# Patient Record
Sex: MALE | Race: OTHER | HISPANIC OR LATINO | URBAN - METROPOLITAN AREA
[De-identification: names, ages, dates, MRNs, and addresses within clinical notes are randomized per-mention and may not be internally consistent; named-entity substitution may affect disease eponyms.]

---

## 2020-03-02 ENCOUNTER — EMERGENCY (EMERGENCY)
Facility: HOSPITAL | Age: 50
LOS: 1 days | Discharge: HOME | End: 2020-03-02
Attending: EMERGENCY MEDICINE
Payer: SELF-PAY

## 2020-03-02 VITALS
WEIGHT: 175.05 LBS | HEART RATE: 67 BPM | HEIGHT: 71 IN | OXYGEN SATURATION: 97 % | RESPIRATION RATE: 20 BRPM | SYSTOLIC BLOOD PRESSURE: 140 MMHG | TEMPERATURE: 98 F | DIASTOLIC BLOOD PRESSURE: 83 MMHG

## 2020-03-02 PROCEDURE — 96374 THER/PROPH/DIAG INJ IV PUSH: CPT

## 2020-03-02 PROCEDURE — 99284 EMERGENCY DEPT VISIT MOD MDM: CPT

## 2020-03-02 PROCEDURE — 99284 EMERGENCY DEPT VISIT MOD MDM: CPT | Mod: 25

## 2020-03-02 RX ORDER — DIAZEPAM 5 MG
5 TABLET ORAL ONCE
Refills: 0 | Status: DISCONTINUED | OUTPATIENT
Start: 2020-03-02 | End: 2020-03-02

## 2020-03-02 RX ORDER — DIAZEPAM 5 MG
1 TABLET ORAL
Qty: 8 | Refills: 0
Start: 2020-03-02 | End: 2020-03-04

## 2020-03-02 RX ORDER — DEXAMETHASONE 0.5 MG/5ML
10 ELIXIR ORAL ONCE
Refills: 0 | Status: COMPLETED | OUTPATIENT
Start: 2020-03-02 | End: 2020-03-02

## 2020-03-02 RX ADMIN — Medication 500 MILLIGRAM(S): at 14:03

## 2020-03-02 RX ADMIN — Medication 5 MILLIGRAM(S): at 14:02

## 2020-03-02 RX ADMIN — Medication 10 MILLIGRAM(S): at 14:04

## 2020-03-02 NOTE — ED PROVIDER NOTE - ATTENDING CONTRIBUTION TO CARE
right shoulder pain radiates to right arm and forearm  right side paraspinal tenderness    good pulses    cervical radiculopathy

## 2020-03-02 NOTE — ED PROVIDER NOTE - PROGRESS NOTE DETAILS
History and findings suggestive of cervical radiculopathy. Will attempt to organize MRI c-spine through coordinator. Will need ortho spine follow up within 1 week. Pt is well appearing walking with steady gait, stable for discharge and follow up without fail with medical doctor. I had a detailed discussion with the patient and/or guardian regarding the historical points, exam findings, and any diagnostic results supporting the discharge diagnosis. Pt educated on care and need for follow up. Strict return instructions and red flag signs and symptoms discussed with patient. Questions answered. Pt shows understanding of discharge information and agrees to follow.

## 2020-03-02 NOTE — ED PROVIDER NOTE - OBJECTIVE STATEMENT
48 y/o M presents to ED complaining of pain to right shoulder blade radiating to right arm with tip of finger numbness x1w. Pt states he feels pinching to his right arm. Pt adds these symptoms occurred after waking up and there were no precedent factors. Pt adds he has neck stiffness. Pt states he took Tylenol to no relief. Pt denies neck injuries in the past. Pt allergic to Penicillin. 50 y/o M presents to ED complaining of pain to right shoulder blade radiating to back of the upper R arm then radiating to dorsal aspect of forearm with tip of R index finger numbness x1w. Pt states he feels pinching to his right arm. Symptoms are continuous, with intermittent worsening with certain movements. Pt adds these symptoms occurred after waking up from sleep and there were no precedent factors. Pt adds he has neck stiffness. Pt states he took Tylenol to no relief. Pt denies neck injuries in the past. Denies any fever, chills, focal weakness, chest pain, SOB, or any other complaints. Pt allergic to Penicillin.

## 2020-03-02 NOTE — ED PROVIDER NOTE - NSFOLLOWUPCLINICS_GEN_ALL_ED_FT
[facility]  Orthopedics  [address] [place] [address]  Phone: [phone]  Fax: [phone]  Follow Up Time:

## 2020-03-02 NOTE — ED PROVIDER NOTE - PATIENT PORTAL LINK FT
You can access the FollowMyHealth Patient Portal offered by [facility] by registering at the following website: [email or web address]. By joining [employer]’s FollowMyHealth portal, you will also be able to view your health information using other applications (apps) compatible with our system.

## 2020-03-02 NOTE — ED ADULT NURSE NOTE - NSIMPLEMENTINTERV_GEN_ALL_ED
Implemented All Universal Safety Interventions:  [place] to call system. Call bell, personal items and telephone within reach. Instruct patient to call for assistance. Room bathroom lighting operational. Non-slip footwear when patient is off stretcher. Physically safe environment: no spills, clutter or unnecessary equipment. Stretcher in lowest position, wheels locked, appropriate side rails in place.

## 2020-03-02 NOTE — ED PROVIDER NOTE - PHYSICAL EXAMINATION
No midline spinal tenderness. Positive spurling test. No midline spinal tenderness. Positive spurling test. C5-T1 nerves motor and sensory testing intact.

## 2020-03-02 NOTE — ED PROVIDER NOTE - NSFOLLOWUPINSTRUCTIONS_ED_ALL_ED_FT
Follow up with the spine orthopedist within 1 week    If you experience any new or worsening symptoms or if you are concerned you can always come back to the emergency for a re-evaluation.  If there were any prescriptions given to you during the visit today take them as prescribed. If you have any questions you can ask the pharmacist.

## 2020-03-02 NOTE — ED PROVIDER NOTE - CLINICAL SUMMARY MEDICAL DECISION MAKING FREE TEXT BOX
50 y/o M presents to ED complaining of right arm sensory symptoms and pain. History and findings suggestive of likely cervical radiculopathy around C5 and C6. Will need orthopedist follow up within 1w and MRI of cervical spine. Will give rx for medications and discharge home.

## 2021-01-27 ENCOUNTER — EMERGENCY (EMERGENCY)
Facility: HOSPITAL | Age: 51
LOS: 1 days | Discharge: HOME | End: 2021-01-27
Attending: EMERGENCY MEDICINE | Admitting: EMERGENCY MEDICINE
Payer: COMMERCIAL

## 2021-01-27 VITALS
DIASTOLIC BLOOD PRESSURE: 66 MMHG | HEIGHT: 71 IN | RESPIRATION RATE: 16 BRPM | OXYGEN SATURATION: 99 % | HEART RATE: 76 BPM | WEIGHT: 164.91 LBS | TEMPERATURE: 98 F | SYSTOLIC BLOOD PRESSURE: 103 MMHG

## 2021-01-27 VITALS
RESPIRATION RATE: 18 BRPM | DIASTOLIC BLOOD PRESSURE: 70 MMHG | SYSTOLIC BLOOD PRESSURE: 104 MMHG | OXYGEN SATURATION: 97 % | TEMPERATURE: 98 F | HEART RATE: 61 BPM

## 2021-01-27 DIAGNOSIS — N45.3 EPIDIDYMO-ORCHITIS: ICD-10-CM

## 2021-01-27 DIAGNOSIS — N50.812 LEFT TESTICULAR PAIN: ICD-10-CM

## 2021-01-27 LAB
ALBUMIN SERPL ELPH-MCNC: 3.8 G/DL — SIGNIFICANT CHANGE UP (ref 3.3–5)
ALP SERPL-CCNC: 65 U/L — SIGNIFICANT CHANGE UP (ref 40–120)
ALT FLD-CCNC: 19 U/L — SIGNIFICANT CHANGE UP (ref 10–45)
ANION GAP SERPL CALC-SCNC: 9 MMOL/L — SIGNIFICANT CHANGE UP (ref 5–17)
APPEARANCE UR: CLEAR — SIGNIFICANT CHANGE UP
AST SERPL-CCNC: 15 U/L — SIGNIFICANT CHANGE UP (ref 10–40)
BASOPHILS # BLD AUTO: 0.07 K/UL — SIGNIFICANT CHANGE UP (ref 0–0.2)
BASOPHILS NFR BLD AUTO: 0.4 % — SIGNIFICANT CHANGE UP (ref 0–2)
BILIRUB SERPL-MCNC: 0.2 MG/DL — SIGNIFICANT CHANGE UP (ref 0.2–1.2)
BILIRUB UR-MCNC: NEGATIVE — SIGNIFICANT CHANGE UP
BUN SERPL-MCNC: 12 MG/DL — SIGNIFICANT CHANGE UP (ref 7–23)
CALCIUM SERPL-MCNC: 9 MG/DL — SIGNIFICANT CHANGE UP (ref 8.4–10.5)
CHLORIDE SERPL-SCNC: 103 MMOL/L — SIGNIFICANT CHANGE UP (ref 96–108)
CO2 SERPL-SCNC: 29 MMOL/L — SIGNIFICANT CHANGE UP (ref 22–31)
COLOR SPEC: YELLOW — SIGNIFICANT CHANGE UP
CREAT SERPL-MCNC: 0.83 MG/DL — SIGNIFICANT CHANGE UP (ref 0.5–1.3)
DIFF PNL FLD: ABNORMAL
EOSINOPHIL # BLD AUTO: 0.52 K/UL — HIGH (ref 0–0.5)
EOSINOPHIL NFR BLD AUTO: 3 % — SIGNIFICANT CHANGE UP (ref 0–6)
EPI CELLS # UR: SIGNIFICANT CHANGE UP /HPF (ref 0–5)
GLUCOSE SERPL-MCNC: 86 MG/DL — SIGNIFICANT CHANGE UP (ref 70–99)
GLUCOSE UR QL: NEGATIVE — SIGNIFICANT CHANGE UP
HCT VFR BLD CALC: 40.4 % — SIGNIFICANT CHANGE UP (ref 39–50)
HGB BLD-MCNC: 12.5 G/DL — LOW (ref 13–17)
IMM GRANULOCYTES NFR BLD AUTO: 0.7 % — SIGNIFICANT CHANGE UP (ref 0–1.5)
KETONES UR-MCNC: NEGATIVE — SIGNIFICANT CHANGE UP
LEUKOCYTE ESTERASE UR-ACNC: NEGATIVE — SIGNIFICANT CHANGE UP
LYMPHOCYTES # BLD AUTO: 19.5 % — SIGNIFICANT CHANGE UP (ref 13–44)
LYMPHOCYTES # BLD AUTO: 3.34 K/UL — HIGH (ref 1–3.3)
MCHC RBC-ENTMCNC: 28.7 PG — SIGNIFICANT CHANGE UP (ref 27–34)
MCHC RBC-ENTMCNC: 30.9 GM/DL — LOW (ref 32–36)
MCV RBC AUTO: 92.9 FL — SIGNIFICANT CHANGE UP (ref 80–100)
MONOCYTES # BLD AUTO: 1.21 K/UL — HIGH (ref 0–0.9)
MONOCYTES NFR BLD AUTO: 7.1 % — SIGNIFICANT CHANGE UP (ref 2–14)
NEUTROPHILS # BLD AUTO: 11.89 K/UL — HIGH (ref 1.8–7.4)
NEUTROPHILS NFR BLD AUTO: 69.3 % — SIGNIFICANT CHANGE UP (ref 43–77)
NITRITE UR-MCNC: NEGATIVE — SIGNIFICANT CHANGE UP
NRBC # BLD: 0 /100 WBCS — SIGNIFICANT CHANGE UP (ref 0–0)
PH UR: 6 — SIGNIFICANT CHANGE UP (ref 5–8)
PLATELET # BLD AUTO: 383 K/UL — SIGNIFICANT CHANGE UP (ref 150–400)
POTASSIUM SERPL-MCNC: 4 MMOL/L — SIGNIFICANT CHANGE UP (ref 3.5–5.3)
POTASSIUM SERPL-SCNC: 4 MMOL/L — SIGNIFICANT CHANGE UP (ref 3.5–5.3)
PROT SERPL-MCNC: 7.2 G/DL — SIGNIFICANT CHANGE UP (ref 6–8.3)
PROT UR-MCNC: ABNORMAL MG/DL
RBC # BLD: 4.35 M/UL — SIGNIFICANT CHANGE UP (ref 4.2–5.8)
RBC # FLD: 13.3 % — SIGNIFICANT CHANGE UP (ref 10.3–14.5)
RBC CASTS # UR COMP ASSIST: < 5 /HPF — SIGNIFICANT CHANGE UP
SODIUM SERPL-SCNC: 141 MMOL/L — SIGNIFICANT CHANGE UP (ref 135–145)
SP GR SPEC: >=1.03 — SIGNIFICANT CHANGE UP (ref 1–1.03)
UROBILINOGEN FLD QL: 0.2 E.U./DL — SIGNIFICANT CHANGE UP
WBC # BLD: 17.15 K/UL — HIGH (ref 3.8–10.5)
WBC # FLD AUTO: 17.15 K/UL — HIGH (ref 3.8–10.5)
WBC UR QL: < 5 /HPF — SIGNIFICANT CHANGE UP

## 2021-01-27 PROCEDURE — 80053 COMPREHEN METABOLIC PANEL: CPT

## 2021-01-27 PROCEDURE — 99285 EMERGENCY DEPT VISIT HI MDM: CPT

## 2021-01-27 PROCEDURE — 96374 THER/PROPH/DIAG INJ IV PUSH: CPT

## 2021-01-27 PROCEDURE — 81001 URINALYSIS AUTO W/SCOPE: CPT

## 2021-01-27 PROCEDURE — 76870 US EXAM SCROTUM: CPT

## 2021-01-27 PROCEDURE — 85025 COMPLETE CBC W/AUTO DIFF WBC: CPT

## 2021-01-27 PROCEDURE — 96375 TX/PRO/DX INJ NEW DRUG ADDON: CPT

## 2021-01-27 PROCEDURE — 87086 URINE CULTURE/COLONY COUNT: CPT

## 2021-01-27 PROCEDURE — 36415 COLL VENOUS BLD VENIPUNCTURE: CPT

## 2021-01-27 PROCEDURE — 99282 EMERGENCY DEPT VISIT SF MDM: CPT

## 2021-01-27 PROCEDURE — 76870 US EXAM SCROTUM: CPT | Mod: 26

## 2021-01-27 PROCEDURE — 99284 EMERGENCY DEPT VISIT MOD MDM: CPT | Mod: 25

## 2021-01-27 RX ORDER — LEVOFLOXACIN 5 MG/ML
1 INJECTION, SOLUTION INTRAVENOUS
Qty: 14 | Refills: 0
Start: 2021-01-27 | End: 2021-02-11

## 2021-01-27 RX ORDER — IBUPROFEN 200 MG
1 TABLET ORAL
Qty: 30 | Refills: 0
Start: 2021-01-27 | End: 2021-02-05

## 2021-01-27 RX ORDER — TAMSULOSIN HYDROCHLORIDE 0.4 MG/1
1 CAPSULE ORAL
Qty: 14 | Refills: 0
Start: 2021-01-27 | End: 2021-02-11

## 2021-01-27 RX ORDER — OXYCODONE AND ACETAMINOPHEN 5; 325 MG/1; MG/1
1 TABLET ORAL ONCE
Refills: 0 | Status: DISCONTINUED | OUTPATIENT
Start: 2021-01-27 | End: 2021-01-27

## 2021-01-27 RX ORDER — LEVOFLOXACIN 5 MG/ML
1 INJECTION, SOLUTION INTRAVENOUS
Qty: 14 | Refills: 0
Start: 2021-01-27 | End: 2021-02-09

## 2021-01-27 RX ORDER — AZITHROMYCIN 500 MG/1
1000 TABLET, FILM COATED ORAL ONCE
Refills: 0 | Status: COMPLETED | OUTPATIENT
Start: 2021-01-27 | End: 2021-01-27

## 2021-01-27 RX ORDER — KETOROLAC TROMETHAMINE 30 MG/ML
30 SYRINGE (ML) INJECTION ONCE
Refills: 0 | Status: DISCONTINUED | OUTPATIENT
Start: 2021-01-27 | End: 2021-01-27

## 2021-01-27 RX ORDER — TAMSULOSIN HYDROCHLORIDE 0.4 MG/1
1 CAPSULE ORAL
Qty: 14 | Refills: 0
Start: 2021-01-27 | End: 2021-02-09

## 2021-01-27 RX ORDER — CEFTRIAXONE 500 MG/1
1000 INJECTION, POWDER, FOR SOLUTION INTRAMUSCULAR; INTRAVENOUS ONCE
Refills: 0 | Status: COMPLETED | OUTPATIENT
Start: 2021-01-27 | End: 2021-01-27

## 2021-01-27 RX ADMIN — OXYCODONE AND ACETAMINOPHEN 1 TABLET(S): 5; 325 TABLET ORAL at 15:22

## 2021-01-27 RX ADMIN — Medication 30 MILLIGRAM(S): at 18:59

## 2021-01-27 RX ADMIN — AZITHROMYCIN 1000 MILLIGRAM(S): 500 TABLET, FILM COATED ORAL at 17:42

## 2021-01-27 RX ADMIN — CEFTRIAXONE 100 MILLIGRAM(S): 500 INJECTION, POWDER, FOR SOLUTION INTRAMUSCULAR; INTRAVENOUS at 17:42

## 2021-01-27 NOTE — ED PROVIDER NOTE - NSFOLLOWUPINSTRUCTIONS_ED_ALL_ED_FT
Take medications as prescribed and f/u with urologist in 10 days for re-evaluation.        EPIDIDYMO-ORCHITIS - AfterCare(R) Instructions(ER/ED)           Epididymo-Orchitis    WHAT YOU NEED TO KNOW:    Epididymo-orchitis is inflammation of your epididymis and testicle. The epididymis is a coiled tube inside your scrotum. It stores and carries sperm from your testicles to your penis. Epididymo-orchitis usually affects the epididymis and testicle on one side, but it may affect both sides.     Male Reproductive System         DISCHARGE INSTRUCTIONS:    Return to the emergency department if:   •You have severe pain in your testicles.      •Your symptoms become worse even after you start treatment with medicine.      Contact your healthcare provider if:   •Your symptoms do not get better within 3 days of treatment or come back after treatment.      •You have a hot, red, tender area on your testicles.      •You have questions or concerns about your condition or care.      Medicines:   •Antibiotics help treat a bacterial infection.       •NSAIDs, such as ibuprofen, help decrease swelling, pain, and fever. This medicine is available with or without a doctor's order. NSAIDs can cause stomach bleeding or kidney problems in certain people. If you take blood thinner medicine, always ask if NSAIDs are safe for you. Always read the medicine label and follow directions. Do not give these medicines to children under 6 months of age without direction from your child's healthcare provider.      •Acetaminophen decreases pain and fever. It is available without a doctor's order. Ask how much to take and how often to take it. Follow directions. Acetaminophen can cause liver damage if not taken correctly.      •Prescription pain medicine may be given. Ask how to take this medicine safely. embed?      •Take your medicine as directed. Contact your healthcare provider if you think your medicine is not helping or if you have side effects. Tell him or her if you are allergic to any medicine. Keep a list of the medicines, vitamins, and herbs you take. Include the amounts, and when and why you take them. Bring the list or the pill bottles to follow-up visits. Carry your medicine list with you in case of an emergency.      Self-care:   •Apply ice on your testicles for 15 to 20 minutes every hour or as directed. Use an ice pack, or put crushed ice in a plastic bag. Cover it with a towel. Ice helps prevent tissue damage and decreases swelling and pain.      •Rest in bed as directed. Elevate your scrotum when you sit or lie down to help reduce swelling and pain. You may be asked to do this by placing a rolled-up towel under your scrotum.      •Scrotal support may be recommended. An athletic supporter provides scrotal support and may make you more comfortable when you stand. Ask your healthcare provider how to use an athletic supporter.       •Do not lift heavy objects. You can make swelling worse if you lift heavy objects or strain.       Follow up with your healthcare provider as directed: Write down your questions so you remember to ask them during your visits.

## 2021-01-27 NOTE — ED ADULT TRIAGE NOTE - CHIEF COMPLAINT QUOTE
Pt reports LLQ pain, left testicular pain/swelling and pain with urination for 3 days. Pt denies chills, fever.

## 2021-01-27 NOTE — CONSULT NOTE ADULT - ASSESSMENT
Patient is a 50M w/ left epididymoorchitis.  Patient requires no acute surgical intervention at moment.  Patient can be discharged to home, with Levofloxacin x 14 days, Flomax, NSAID's for pain and swelling, and follow up to Urology Clinic in 2 weeks. Elevate testes using scrotal support.    Plan:  -NSAId's for pain and swelling  -Levofloxacin x 14 days  -Flomax   -Scrotal support  -Please call and follow up at Urology clinic in 14 days. [phone]

## 2021-01-27 NOTE — ED PROVIDER NOTE - PATIENT PORTAL LINK FT
You can access the FollowMyHealth Patient Portal offered by [facility] by registering at the following website: [email or web address]. By joining [employer]’s FollowMyHealth portal, you will also be able to view your health information using other applications (apps) compatible with our system.

## 2021-01-27 NOTE — ED PROVIDER NOTE - PROGRESS NOTE DETAILS
Labs remarkable for leukocytosis. US was remarkable for complex hydrocele. Urology was called for consult; pt received Azithromycin. Disposition pending, based urology recommendation. Case discussed with urology on call. pt with leukocytosis and left epididymoorchitis,, complex hydrocele vs early pyocele.  pt received ceftriaxone/Azithromycin. Disposition pending  based urology eval/recommendation.

## 2021-01-27 NOTE — CONSULT NOTE ADULT - SUBJECTIVE AND OBJECTIVE BOX
Patient is a 50y old  Male who presents with a chief complaint of left testicular pain.    HPI:    Patient is a 50M w/ no PMH reporting to ED w/ 3 days left testicular pain and swelling.  Patient states it was sudden onset s/p sexual intercourse 1 day prior and has never happened to him in the past.  He has not taken any pain medications to alleviate pain.  Patient describes pain as sharp when he moves, but states he feels better when he is laying down still. Patient denies n/v/f/c, dysuria, hematuria, discharge, urge incontinence, flank pain.    Vital Signs Last 24 Hrs  T(C): 36.7 (27 Jan 2021 20:37), Max: 36.9 (27 Jan 2021 14:56)  T(F): 98 (27 Jan 2021 20:37), Max: 98.5 (27 Jan 2021 14:56)  HR: 61 (27 Jan 2021 20:37) (61 - 76)  BP: 104/70 (27 Jan 2021 20:37) (102/65 - 104/70)  BP(mean): --  RR: 18 (27 Jan 2021 20:37) (16 - 18)  SpO2: 97% (27 Jan 2021 20:37) (97% - 99%)  I&O's Summary      PE:  Gen: NAD, alert and oriented x 3   Abd: soft nt/nd. Negative CVAT B/L  : Left testicle appears edematous, left epididymus feels firm. Mild Tenderness to left scrotum.  MERNA: Deferred    LABS:                        12.5   17.15 )-----------( 383      ( 27 Jan 2021 17:55 )             40.4     01-27    141  |  103  |  12  ----------------------------<  86  4.0   |  29  |  0.83    Ca    9.0      27 Jan 2021 17:55    TPro  7.2  /  Alb  3.8  /  TBili  0.2  /  DBili  x   /  AST  15  /  ALT  19  /  AlkPhos  65  01-27

## 2021-01-27 NOTE — ED PROVIDER NOTE - CLINICAL SUMMARY MEDICAL DECISION MAKING FREE TEXT BOX
50M with testicular pain, inguinal edema on exam, and dysuria x3-4  days. Plan: Will order labs, UA, US scrotum. Will R/u torsion or epididymitis. 50M with testicular pain and swelling,  dysuria x3-4  days. on exam- significant left testicular edema, erythema and tenderness., no penile discharge, trash, lesions. Plan: pain control,  labs, UA, US scrotum to r/o torsion/ mass/ orchitis/epididymitis.

## 2021-01-27 NOTE — ED ADULT NURSE NOTE - OBJECTIVE STATEMENT
50 y.o M a&ox4 walked in from triage c.o testicular pain. x 4 days of L testicular pain. pt states " I had sex 4 days ago 50 y.o M a&ox4 walked in from triage c.o testicular pain. x 4 days of L testicular pain. pt states " I had sex 4 days ago and the next day I lifted something heavy  and I have had pain since." + LLQ abdominal pain and L testicular swelling. + nausea, no vomiting. + pain with urination. no hematuria no blood in stool no pmh or psh.

## 2021-01-27 NOTE — ED PROVIDER NOTE - OBJECTIVE STATEMENT
50M presents to the ED complaining of left testicular pain and swelling appreciated for x3 days, starting Sunday, 01/24/2021. Pt reports having sexual intercourse x1 day before onset of pain and swelling. Patient reports pain has worsened since Sunday. Patient admits pain with urination, and denies penile discharge, abdominal pain, flank pain, blood in urine, fever, chills or any other acute medical complaints at this time. Patient denies hx of STDs.

## 2021-01-28 LAB
CULTURE RESULTS: NO GROWTH — SIGNIFICANT CHANGE UP
SPECIMEN SOURCE: SIGNIFICANT CHANGE UP

## 2021-01-29 RX ORDER — IBUPROFEN 200 MG
1 TABLET ORAL
Qty: 30 | Refills: 0
Start: 2021-01-29 | End: 2021-02-07

## 2022-05-06 ENCOUNTER — EMERGENCY (EMERGENCY)
Facility: HOSPITAL | Age: 52
LOS: 1 days | Discharge: HOME | End: 2022-05-06
Attending: STUDENT IN AN ORGANIZED HEALTH CARE EDUCATION/TRAINING PROGRAM | Admitting: EMERGENCY MEDICINE
Payer: COMMERCIAL

## 2022-05-06 VITALS
WEIGHT: 169.98 LBS | DIASTOLIC BLOOD PRESSURE: 79 MMHG | TEMPERATURE: 98 F | SYSTOLIC BLOOD PRESSURE: 122 MMHG | RESPIRATION RATE: 16 BRPM | HEART RATE: 70 BPM | OXYGEN SATURATION: 96 % | HEIGHT: 71 IN

## 2022-05-06 PROCEDURE — 87086 URINE CULTURE/COLONY COUNT: CPT

## 2022-05-06 PROCEDURE — 81001 URINALYSIS AUTO W/SCOPE: CPT

## 2022-05-06 PROCEDURE — 76870 US EXAM SCROTUM: CPT | Mod: 26

## 2022-05-06 PROCEDURE — 96372 THER/PROPH/DIAG INJ SC/IM: CPT

## 2022-05-06 PROCEDURE — 87591 N.GONORRHOEAE DNA AMP PROB: CPT

## 2022-05-06 PROCEDURE — 99284 EMERGENCY DEPT VISIT MOD MDM: CPT | Mod: 25

## 2022-05-06 PROCEDURE — 99284 EMERGENCY DEPT VISIT MOD MDM: CPT

## 2022-05-06 PROCEDURE — 87491 CHLMYD TRACH DNA AMP PROBE: CPT

## 2022-05-06 PROCEDURE — 76870 US EXAM SCROTUM: CPT

## 2022-05-06 RX ORDER — CEFTRIAXONE 500 MG/1
500 INJECTION, POWDER, FOR SOLUTION INTRAMUSCULAR; INTRAVENOUS ONCE
Refills: 0 | Status: COMPLETED | OUTPATIENT
Start: 2022-05-06 | End: 2022-05-06

## 2022-05-06 RX ORDER — IBUPROFEN 200 MG
600 TABLET ORAL ONCE
Refills: 0 | Status: COMPLETED | OUTPATIENT
Start: 2022-05-06 | End: 2022-05-06

## 2022-05-06 RX ORDER — LEVOFLOXACIN 5 MG/ML
1 INJECTION, SOLUTION INTRAVENOUS
Qty: 10 | Refills: 0
Start: 2022-05-06 | End: 2022-05-15

## 2022-05-06 RX ADMIN — Medication 600 MILLIGRAM(S): at 18:19

## 2022-05-06 RX ADMIN — CEFTRIAXONE 500 MILLIGRAM(S): 500 INJECTION, POWDER, FOR SOLUTION INTRAMUSCULAR; INTRAVENOUS at 20:31

## 2022-05-06 NOTE — ED PROVIDER NOTE - NSDCPRINTRESULTS_ED_ALL_ED
pts insurance will not cover Lantus, preferred Basaglar, new rx called into the pharmacy    Patient requests all Lab, Cardiology, and Radiology Results on their Discharge Instructions

## 2022-05-06 NOTE — ED PROVIDER NOTE - NSFOLLOWUPCLINICS_GEN_ALL_ED_FT
[facility]  Family Medicine  [address]  [place], [place] [address]  Phone: [phone]  Fax:

## 2022-05-06 NOTE — ED PROVIDER NOTE - PHYSICAL EXAMINATION
General: comfortable, resting in ED  HEENT: atraumatic, no eye erythema or discharge  Pulm: no cyanosis, no added work of breathing  Cardiac: extremities warm, intact peripheral pulse  GI: no abdominal distension, abdomen nontender in all 4 quadrants  Neuro: alert, conversant  Psych: neutral affect, cooperative  Msk: no gross deformity or instability  Skin: no erythema or rash    (with RN Clive): L testicular tenderness and mild scrotal erythema, 2 cords bilaterally, no hernia on valsalva bilaterally, uncircumcised penis with no discharge or erythema

## 2022-05-06 NOTE — ED ADULT NURSE NOTE - NSIMPLEMENTINTERV_GEN_ALL_ED
Implemented All Universal Safety Interventions:  [place] to call system. Call bell, personal items and telephone within reach. Instruct patient to call for assistance. Room bathroom lighting operational. Non-slip footwear when patient is off stretcher. Physically safe environment: no spills, clutter or unnecessary equipment. Stretcher in lowest position, wheels locked, appropriate side rails in place.

## 2022-05-06 NOTE — ED ADULT NURSE NOTE - OBJECTIVE STATEMENT
Pateint w/ no PMHx, c/o of 1 day left lower abdominal pain, radiates to left groin/testicular area, no dysuria or urinary difficulties, no nausea/vomitting.

## 2022-05-06 NOTE — ED PROVIDER NOTE - NS ED ROS FT
REVIEW OF SYSTEMS  positive for: stomach pain,   negative for: fever, headache, eye pain, runny nose, sore throat, cough, shortness of breath, chest pain, vomiting, diarrhea, dysuria, myalgias, rash

## 2022-05-06 NOTE — ED PROVIDER NOTE - PATIENT PORTAL LINK FT
You can access the FollowMyHealth Patient Portal offered by [facility] by registering at the following website: [email or web address]. By joining [employer]’s FollowMyHealth portal, you will also be able to view your health information using other applications (apps) compatible with our system.

## 2022-05-06 NOTE — ED PROVIDER NOTE - PROGRESS NOTE DETAILS
US remarkable for epididymitis, will treat and discharge home with return precautions and outpatient followup.

## 2022-05-06 NOTE — ED PROVIDER NOTE - OBJECTIVE STATEMENT
51M with no past medical history, now with 2 days of constant moderate L testicular pain radiating to LLQ, is MSM with anal-insertive intercourse.

## 2022-05-06 NOTE — ED PROVIDER NOTE - CLINICAL SUMMARY MEDICAL DECISION MAKING FREE TEXT BOX
Concern for L testicular pain ?epdiditmytis ?orchitis.  Will provide G/C screening given MSM with anal-insertive intercourse.  Given age and constant pain x 2 days unlikely torsion, however will r/o on ultrasound.

## 2022-05-09 DIAGNOSIS — N45.1 EPIDIDYMITIS: ICD-10-CM

## 2022-05-09 DIAGNOSIS — R10.32 LEFT LOWER QUADRANT PAIN: ICD-10-CM

## 2022-05-09 DIAGNOSIS — F17.200 NICOTINE DEPENDENCE, UNSPECIFIED, UNCOMPLICATED: ICD-10-CM

## 2022-05-09 DIAGNOSIS — N50.812 LEFT TESTICULAR PAIN: ICD-10-CM

## 2023-11-17 NOTE — ED PROVIDER NOTE - TOBACCO USE
C/o itching and irritation to penis x2 days .  Denies swelling  Pt also c/o ear pain states its sometimes hard to hear at times
Unknown if ever smoked

## 2024-02-04 PROBLEM — Z78.9 OTHER SPECIFIED HEALTH STATUS: Chronic | Status: ACTIVE | Noted: 2020-03-02
